# Patient Record
Sex: FEMALE | Race: WHITE | NOT HISPANIC OR LATINO | URBAN - NONMETROPOLITAN AREA
[De-identification: names, ages, dates, MRNs, and addresses within clinical notes are randomized per-mention and may not be internally consistent; named-entity substitution may affect disease eponyms.]

---

## 2019-12-12 ENCOUNTER — IMPORTED ENCOUNTER (OUTPATIENT)
Dept: URBAN - NONMETROPOLITAN AREA CLINIC 1 | Facility: CLINIC | Age: 64
End: 2019-12-12

## 2019-12-12 PROBLEM — H25.13: Noted: 2019-12-12

## 2019-12-12 PROBLEM — H52.4: Noted: 2019-12-12

## 2019-12-12 PROCEDURE — 92015 DETERMINE REFRACTIVE STATE: CPT

## 2019-12-12 NOTE — PATIENT DISCUSSION
Presbyopia OUDiscussed refractive status in detail with patient. New glasses and contact Rx given today. Discussed proper care replacement and hygiene. Continue to monitor. Kasi OUDiscussed diagnosis with patient. Reviewed symptoms related to cataract progression. Discussed various treatment options with patient. No treatment required at this time. Continue to monitor.

## 2021-03-11 ENCOUNTER — IMPORTED ENCOUNTER (OUTPATIENT)
Dept: URBAN - NONMETROPOLITAN AREA CLINIC 1 | Facility: CLINIC | Age: 66
End: 2021-03-11

## 2021-03-11 PROCEDURE — 92015 DETERMINE REFRACTIVE STATE: CPT

## 2022-04-09 ASSESSMENT — KERATOMETRY
OD_K1POWER_DIOPTERS: 45.75
OS_AXISANGLE_DEGREES: 024
OS_AXISANGLE_DEGREES: 138
OD_K2POWER_DIOPTERS: 45.75
OS_K1POWER_DIOPTERS: 45.50
OS_K2POWER_DIOPTERS: 45.50
OD_K2POWER_DIOPTERS: 45.25
OD_K1POWER_DIOPTERS: 45.50
OS_K2POWER_DIOPTERS: 45.75
OD_AXISANGLE_DEGREES: 113
OS_K1POWER_DIOPTERS: 45.25
OD_AXISANGLE_DEGREES: 019

## 2022-04-09 ASSESSMENT — TONOMETRY
OS_IOP_MMHG: 16
OS_IOP_MMHG: 17
OD_IOP_MMHG: 16
OD_IOP_MMHG: 17

## 2022-04-09 ASSESSMENT — VISUAL ACUITY
OS_SC: 20/40
OD_SC: 20/50-
OD_SC: 20/20
OS_SC: 20/20

## 2023-11-10 ENCOUNTER — ESTABLISHED PATIENT (OUTPATIENT)
Dept: URBAN - NONMETROPOLITAN AREA CLINIC 1 | Facility: CLINIC | Age: 68
End: 2023-11-10

## 2023-11-10 DIAGNOSIS — H52.4: ICD-10-CM

## 2023-11-10 PROCEDURE — 92310-E CONTACT LENS FITTING ESTABLISH PATIENT

## 2023-11-10 PROCEDURE — S0621 ROUTINE OPHTHALMOLOGICAL EXA: HCPCS

## 2023-11-10 ASSESSMENT — KERATOMETRY
OS_K2POWER_DIOPTERS: 45.50
OD_K1POWER_DIOPTERS: 45.75
OS_AXISANGLE_DEGREES: 024
OS_K1POWER_DIOPTERS: 45.25
OD_K2POWER_DIOPTERS: 45.25
OD_AXISANGLE_DEGREES: 113

## 2023-11-10 ASSESSMENT — TONOMETRY
OD_IOP_MMHG: 13
OS_IOP_MMHG: 13

## 2023-11-10 ASSESSMENT — VISUAL ACUITY
OU_CC: 20/20-1
OS_CC: 20/20-2
OD_CC: 20/29-2

## 2024-12-24 ENCOUNTER — COMPREHENSIVE EXAM (OUTPATIENT)
Age: 69
End: 2024-12-24

## 2024-12-24 DIAGNOSIS — H52.4: ICD-10-CM

## 2024-12-24 PROCEDURE — 92014CFS ESTABLISHED PT, EMPLOYEE ROUTINE COMP EXAM
